# Patient Record
Sex: MALE | Race: WHITE | HISPANIC OR LATINO | Employment: FULL TIME | URBAN - METROPOLITAN AREA
[De-identification: names, ages, dates, MRNs, and addresses within clinical notes are randomized per-mention and may not be internally consistent; named-entity substitution may affect disease eponyms.]

---

## 2018-03-28 ENCOUNTER — HOSPITAL ENCOUNTER (OUTPATIENT)
Facility: HOSPITAL | Age: 38
Discharge: HOME | End: 2018-03-30
Attending: EMERGENCY MEDICINE | Admitting: HOSPITALIST

## 2018-03-28 DIAGNOSIS — R07.89 CHEST WALL PAIN: ICD-10-CM

## 2018-03-28 DIAGNOSIS — M25.50 ARTHRALGIA, UNSPECIFIED JOINT: ICD-10-CM

## 2018-03-28 DIAGNOSIS — R50.9 FEVER, UNSPECIFIED FEVER CAUSE: Primary | ICD-10-CM

## 2018-03-28 DIAGNOSIS — I38 ENDOCARDITIS: ICD-10-CM

## 2018-03-28 DIAGNOSIS — D72.829 LEUKOCYTOSIS, UNSPECIFIED TYPE: ICD-10-CM

## 2018-03-28 LAB
ALBUMIN SERPL BCP-MCNC: 3.2 G/DL
ALP SERPL-CCNC: 143 U/L
ALT SERPL W/O P-5'-P-CCNC: 138 U/L
ANION GAP SERPL CALC-SCNC: 10 MMOL/L
AST SERPL-CCNC: 63 U/L
BASOPHILS # BLD AUTO: 0.05 K/UL
BASOPHILS NFR BLD: 0.3 %
BILIRUB SERPL-MCNC: 0.3 MG/DL
BILIRUB UR QL STRIP: NEGATIVE
BUN SERPL-MCNC: 12 MG/DL
CALCIUM SERPL-MCNC: 9.5 MG/DL
CHLORIDE SERPL-SCNC: 103 MMOL/L
CK SERPL-CCNC: 60 U/L
CLARITY UR: CLEAR
CO2 SERPL-SCNC: 24 MMOL/L
COLOR UR: YELLOW
CREAT SERPL-MCNC: 0.8 MG/DL
CRP SERPL-MCNC: 147.1 MG/L
DIFFERENTIAL METHOD: ABNORMAL
EOSINOPHIL # BLD AUTO: 0.1 K/UL
EOSINOPHIL NFR BLD: 0.6 %
ERYTHROCYTE [DISTWIDTH] IN BLOOD BY AUTOMATED COUNT: 12.5 %
ERYTHROCYTE [SEDIMENTATION RATE] IN BLOOD BY WESTERGREN METHOD: 103 MM/HR
EST. GFR  (AFRICAN AMERICAN): >60 ML/MIN/1.73 M^2
EST. GFR  (NON AFRICAN AMERICAN): >60 ML/MIN/1.73 M^2
GLUCOSE SERPL-MCNC: 108 MG/DL
GLUCOSE UR QL STRIP: NEGATIVE
HCT VFR BLD AUTO: 38.3 %
HGB BLD-MCNC: 12.8 G/DL
HGB UR QL STRIP: ABNORMAL
INR PPP: 1.2
KETONES UR QL STRIP: NEGATIVE
LEUKOCYTE ESTERASE UR QL STRIP: NEGATIVE
LYMPHOCYTES # BLD AUTO: 3.1 K/UL
LYMPHOCYTES NFR BLD: 18 %
MCH RBC QN AUTO: 29.4 PG
MCHC RBC AUTO-ENTMCNC: 33.4 G/DL
MCV RBC AUTO: 88 FL
MICROSCOPIC COMMENT: ABNORMAL
MONOCYTES # BLD AUTO: 1.6 K/UL
MONOCYTES NFR BLD: 8.9 %
NEUTROPHILS # BLD AUTO: 12.6 K/UL
NEUTROPHILS NFR BLD: 72.2 %
NITRITE UR QL STRIP: NEGATIVE
PH UR STRIP: 6 [PH] (ref 5–8)
PLATELET # BLD AUTO: 417 K/UL
PMV BLD AUTO: 9.3 FL
POTASSIUM SERPL-SCNC: 3.9 MMOL/L
PROT SERPL-MCNC: 8.3 G/DL
PROT UR QL STRIP: NEGATIVE
PROTHROMBIN TIME: 12.3 SEC
RBC # BLD AUTO: 4.35 M/UL
RBC #/AREA URNS HPF: 5 /HPF (ref 0–4)
SODIUM SERPL-SCNC: 137 MMOL/L
SP GR UR STRIP: 1.01 (ref 1–1.03)
SQUAMOUS #/AREA URNS HPF: 5 /HPF
URATE SERPL-MCNC: 6.3 MG/DL
URN SPEC COLLECT METH UR: ABNORMAL
UROBILINOGEN UR STRIP-ACNC: NEGATIVE EU/DL
WBC # BLD AUTO: 17.46 K/UL

## 2018-03-28 PROCEDURE — 84550 ASSAY OF BLOOD/URIC ACID: CPT

## 2018-03-28 PROCEDURE — 85025 COMPLETE CBC W/AUTO DIFF WBC: CPT

## 2018-03-28 PROCEDURE — 87491 CHLMYD TRACH DNA AMP PROBE: CPT

## 2018-03-28 PROCEDURE — 93005 ELECTROCARDIOGRAM TRACING: CPT

## 2018-03-28 PROCEDURE — 81000 URINALYSIS NONAUTO W/SCOPE: CPT

## 2018-03-28 PROCEDURE — G0378 HOSPITAL OBSERVATION PER HR: HCPCS

## 2018-03-28 PROCEDURE — 93010 ELECTROCARDIOGRAM REPORT: CPT | Mod: ,,, | Performed by: INTERNAL MEDICINE

## 2018-03-28 PROCEDURE — 85610 PROTHROMBIN TIME: CPT

## 2018-03-28 PROCEDURE — 36415 COLL VENOUS BLD VENIPUNCTURE: CPT

## 2018-03-28 PROCEDURE — 82550 ASSAY OF CK (CPK): CPT

## 2018-03-28 PROCEDURE — 80307 DRUG TEST PRSMV CHEM ANLYZR: CPT

## 2018-03-28 PROCEDURE — 87040 BLOOD CULTURE FOR BACTERIA: CPT | Mod: 59

## 2018-03-28 PROCEDURE — 84145 PROCALCITONIN (PCT): CPT

## 2018-03-28 PROCEDURE — 96361 HYDRATE IV INFUSION ADD-ON: CPT

## 2018-03-28 PROCEDURE — 25000003 PHARM REV CODE 250: Performed by: NURSE PRACTITIONER

## 2018-03-28 PROCEDURE — 80053 COMPREHEN METABOLIC PANEL: CPT

## 2018-03-28 PROCEDURE — 86140 C-REACTIVE PROTEIN: CPT

## 2018-03-28 PROCEDURE — 99285 EMERGENCY DEPT VISIT HI MDM: CPT | Mod: 25

## 2018-03-28 PROCEDURE — 85651 RBC SED RATE NONAUTOMATED: CPT

## 2018-03-28 RX ADMIN — SODIUM CHLORIDE 1000 ML: 0.9 INJECTION, SOLUTION INTRAVENOUS at 08:03

## 2018-03-29 PROBLEM — R74.01 TRANSAMINITIS: Status: ACTIVE | Noted: 2018-03-29

## 2018-03-29 PROBLEM — D72.829 LEUKOCYTOSIS: Status: ACTIVE | Noted: 2018-03-29

## 2018-03-29 PROBLEM — Z72.0 TOBACCO USE: Chronic | Status: ACTIVE | Noted: 2018-03-29

## 2018-03-29 LAB
ALBUMIN SERPL BCP-MCNC: 2.8 G/DL
ALP SERPL-CCNC: 127 U/L
ALT SERPL W/O P-5'-P-CCNC: 106 U/L
AMPHET+METHAMPHET UR QL: NEGATIVE
ANION GAP SERPL CALC-SCNC: 7 MMOL/L
APAP SERPL-MCNC: <3 UG/ML
AST SERPL-CCNC: 43 U/L
BARBITURATES UR QL SCN>200 NG/ML: NEGATIVE
BASOPHILS # BLD AUTO: 0.06 K/UL
BASOPHILS NFR BLD: 0.3 %
BENZODIAZ UR QL SCN>200 NG/ML: NEGATIVE
BILIRUB SERPL-MCNC: 0.6 MG/DL
BUN SERPL-MCNC: 11 MG/DL
BZE UR QL SCN: NEGATIVE
CALCIUM SERPL-MCNC: 9.5 MG/DL
CANNABINOIDS UR QL SCN: NEGATIVE
CHLORIDE SERPL-SCNC: 106 MMOL/L
CHOLEST SERPL-MCNC: 107 MG/DL
CHOLEST/HDLC SERPL: 5.1 {RATIO}
CO2 SERPL-SCNC: 26 MMOL/L
CREAT SERPL-MCNC: 0.8 MG/DL
CREAT UR-MCNC: 102.1 MG/DL
DEPRECATED S PYO AG THROAT QL EIA: NEGATIVE
DIFFERENTIAL METHOD: ABNORMAL
EOSINOPHIL # BLD AUTO: 0.1 K/UL
EOSINOPHIL NFR BLD: 0.7 %
ERYTHROCYTE [DISTWIDTH] IN BLOOD BY AUTOMATED COUNT: 12.6 %
EST. GFR  (AFRICAN AMERICAN): >60 ML/MIN/1.73 M^2
EST. GFR  (NON AFRICAN AMERICAN): >60 ML/MIN/1.73 M^2
ETHANOL SERPL-MCNC: <10 MG/DL
FLUAV AG SPEC QL IA: NEGATIVE
FLUBV AG SPEC QL IA: NEGATIVE
GLUCOSE SERPL-MCNC: 89 MG/DL
HAV IGM SERPL QL IA: NEGATIVE
HBV CORE IGM SERPL QL IA: NEGATIVE
HBV SURFACE AG SERPL QL IA: NEGATIVE
HCT VFR BLD AUTO: 36.7 %
HCV AB SERPL QL IA: NEGATIVE
HDLC SERPL-MCNC: 21 MG/DL
HDLC SERPL: 19.6 %
HGB BLD-MCNC: 12.1 G/DL
HIV1+2 IGG SERPL QL IA.RAPID: NEGATIVE
LDLC SERPL CALC-MCNC: 68.8 MG/DL
LIPASE SERPL-CCNC: 13 U/L
LYMPHOCYTES # BLD AUTO: 3 K/UL
LYMPHOCYTES NFR BLD: 15.9 %
MAGNESIUM SERPL-MCNC: 2 MG/DL
MCH RBC QN AUTO: 29.4 PG
MCHC RBC AUTO-ENTMCNC: 33 G/DL
MCV RBC AUTO: 89 FL
METHADONE UR QL SCN>300 NG/ML: NEGATIVE
MONOCYTES # BLD AUTO: 1.3 K/UL
MONOCYTES NFR BLD: 6.6 %
NEUTROPHILS # BLD AUTO: 14.5 K/UL
NEUTROPHILS NFR BLD: 76.5 %
NONHDLC SERPL-MCNC: 86 MG/DL
OPIATES UR QL SCN: NEGATIVE
PCP UR QL SCN>25 NG/ML: NEGATIVE
PLATELET # BLD AUTO: 372 K/UL
PMV BLD AUTO: 9.3 FL
POTASSIUM SERPL-SCNC: 4.3 MMOL/L
PROCALCITONIN SERPL IA-MCNC: 0.1 NG/ML
PROT SERPL-MCNC: 7.4 G/DL
RBC # BLD AUTO: 4.12 M/UL
RHEUMATOID FACT SERPL-ACNC: <10 IU/ML
SODIUM SERPL-SCNC: 139 MMOL/L
SPECIMEN SOURCE: NORMAL
TOXICOLOGY INFORMATION: NORMAL
TRIGL SERPL-MCNC: 86 MG/DL
TSH SERPL DL<=0.005 MIU/L-ACNC: 2.28 UIU/ML
WBC # BLD AUTO: 18.92 K/UL

## 2018-03-29 PROCEDURE — 80061 LIPID PANEL: CPT

## 2018-03-29 PROCEDURE — 87081 CULTURE SCREEN ONLY: CPT

## 2018-03-29 PROCEDURE — 86618 LYME DISEASE ANTIBODY: CPT

## 2018-03-29 PROCEDURE — 25000003 PHARM REV CODE 250: Performed by: NURSE PRACTITIONER

## 2018-03-29 PROCEDURE — 36415 COLL VENOUS BLD VENIPUNCTURE: CPT

## 2018-03-29 PROCEDURE — 80074 ACUTE HEPATITIS PANEL: CPT

## 2018-03-29 PROCEDURE — 85025 COMPLETE CBC W/AUTO DIFF WBC: CPT

## 2018-03-29 PROCEDURE — 63600175 PHARM REV CODE 636 W HCPCS: Performed by: EMERGENCY MEDICINE

## 2018-03-29 PROCEDURE — 87400 INFLUENZA A/B EACH AG IA: CPT

## 2018-03-29 PROCEDURE — 87880 STREP A ASSAY W/OPTIC: CPT

## 2018-03-29 PROCEDURE — 86431 RHEUMATOID FACTOR QUANT: CPT

## 2018-03-29 PROCEDURE — 96365 THER/PROPH/DIAG IV INF INIT: CPT | Mod: 25

## 2018-03-29 PROCEDURE — 84443 ASSAY THYROID STIM HORMONE: CPT

## 2018-03-29 PROCEDURE — G0378 HOSPITAL OBSERVATION PER HR: HCPCS

## 2018-03-29 PROCEDURE — 80320 DRUG SCREEN QUANTALCOHOLS: CPT

## 2018-03-29 PROCEDURE — 80053 COMPREHEN METABOLIC PANEL: CPT

## 2018-03-29 PROCEDURE — 86038 ANTINUCLEAR ANTIBODIES: CPT

## 2018-03-29 PROCEDURE — 83735 ASSAY OF MAGNESIUM: CPT

## 2018-03-29 PROCEDURE — 86617 LYME DISEASE ANTIBODY: CPT | Mod: 59

## 2018-03-29 PROCEDURE — 86703 HIV-1/HIV-2 1 RESULT ANTBDY: CPT

## 2018-03-29 PROCEDURE — 83690 ASSAY OF LIPASE: CPT

## 2018-03-29 PROCEDURE — 80329 ANALGESICS NON-OPIOID 1 OR 2: CPT

## 2018-03-29 RX ORDER — IBUPROFEN 400 MG/1
400 TABLET ORAL EVERY 6 HOURS PRN
Status: DISCONTINUED | OUTPATIENT
Start: 2018-03-29 | End: 2018-03-30 | Stop reason: HOSPADM

## 2018-03-29 RX ORDER — ACETAMINOPHEN 10 MG/ML
1000 INJECTION, SOLUTION INTRAVENOUS ONCE
Status: COMPLETED | OUTPATIENT
Start: 2018-03-29 | End: 2018-03-29

## 2018-03-29 RX ORDER — SODIUM CHLORIDE 9 MG/ML
INJECTION, SOLUTION INTRAVENOUS CONTINUOUS
Status: DISCONTINUED | OUTPATIENT
Start: 2018-03-29 | End: 2018-03-29

## 2018-03-29 RX ORDER — ONDANSETRON 2 MG/ML
4 INJECTION INTRAMUSCULAR; INTRAVENOUS EVERY 6 HOURS PRN
Status: DISCONTINUED | OUTPATIENT
Start: 2018-03-29 | End: 2018-03-30 | Stop reason: HOSPADM

## 2018-03-29 RX ORDER — IBUPROFEN 200 MG
1 TABLET ORAL DAILY
Status: DISCONTINUED | OUTPATIENT
Start: 2018-03-29 | End: 2018-03-30 | Stop reason: HOSPADM

## 2018-03-29 RX ORDER — SODIUM CHLORIDE 9 MG/ML
INJECTION, SOLUTION INTRAVENOUS CONTINUOUS
Status: ACTIVE | OUTPATIENT
Start: 2018-03-29 | End: 2018-03-29

## 2018-03-29 RX ADMIN — SODIUM CHLORIDE: 0.9 INJECTION, SOLUTION INTRAVENOUS at 02:03

## 2018-03-29 RX ADMIN — SODIUM CHLORIDE: 0.9 INJECTION, SOLUTION INTRAVENOUS at 09:03

## 2018-03-29 RX ADMIN — IBUPROFEN 400 MG: 400 TABLET, FILM COATED ORAL at 10:03

## 2018-03-29 RX ADMIN — ACETAMINOPHEN 1000 MG: 10 INJECTION, SOLUTION INTRAVENOUS at 12:03

## 2018-03-29 NOTE — PLAN OF CARE
Problem: Patient Care Overview  Goal: Plan of Care Review  Outcome: Ongoing (interventions implemented as appropriate)  Fall prevention precautions maintained, pt remained free of falls throughout shift, call bell and personal items within reach, pt afebrile since coming to floor, no complaints of pain at this time. 24 hour chart check completed. Will continue to monitor

## 2018-03-29 NOTE — PLAN OF CARE
Problem: Patient Care Overview  Goal: Plan of Care Review  Outcome: Ongoing (interventions implemented as appropriate)  Patient remains free of falls and safety precautions maintained. Afebrile. No complaints of pain. Temp monitored. Will continue to monitor. 12 hour chart check.

## 2018-03-29 NOTE — SUBJECTIVE & OBJECTIVE
History reviewed. No pertinent past medical history.    History reviewed. No pertinent surgical history.    Review of patient's allergies indicates:  No Known Allergies    No current facility-administered medications on file prior to encounter.      No current outpatient prescriptions on file prior to encounter.     Family History     Unknown to patient        Social History Main Topics    Smoking status: Current Every Day Smoker     Types: Cigarettes    Smokeless tobacco: Never Used    Alcohol use No    Drug use: No    Sexual activity: Not on file     Review of Systems   Constitutional: Positive for fever. Negative for activity change, appetite change, chills, diaphoresis, fatigue and unexpected weight change.   HENT: Positive for sore throat. Negative for congestion, dental problem, ear pain, mouth sores, postnasal drip, rhinorrhea, sinus pressure and trouble swallowing.    Eyes: Negative for photophobia and visual disturbance.   Respiratory: Negative for apnea, cough, chest tightness, shortness of breath and wheezing.    Cardiovascular: Negative for chest pain, palpitations and leg swelling.   Gastrointestinal: Negative for abdominal distention, abdominal pain, blood in stool, constipation, diarrhea, nausea and vomiting.   Endocrine: Negative for polydipsia, polyphagia and polyuria.   Genitourinary: Negative for decreased urine volume, difficulty urinating, discharge, dysuria, flank pain, frequency, hematuria, penile pain and urgency.   Musculoskeletal: Positive for arthralgias (Right knee and left ankle pain) and myalgias. Negative for back pain, gait problem, joint swelling, neck pain and neck stiffness.        Generalized body aches.    Skin: Negative for color change, pallor, rash and wound.   Allergic/Immunologic: Negative for immunocompromised state.   Neurological: Negative for dizziness, tremors, seizures, syncope, facial asymmetry, speech difficulty, weakness, light-headedness, numbness and  headaches.   Hematological: Does not bruise/bleed easily.   Psychiatric/Behavioral: Negative for confusion, hallucinations and sleep disturbance. The patient is not nervous/anxious.    All other systems reviewed and are negative.    Objective:     Vital Signs (Most Recent):  Temp: 99.4 °F (37.4 °C) (03/28/18 1917)  Pulse: 76 (03/29/18 0005)  Resp: 18 (03/29/18 0005)  BP: 125/80 (03/29/18 0005)  SpO2: 98 % (03/29/18 0005) Vital Signs (24h Range):  Temp:  [99.4 °F (37.4 °C)] 99.4 °F (37.4 °C)  Pulse:  [76-88] 76  Resp:  [18-20] 18  SpO2:  [95 %-98 %] 98 %  BP: (125-129)/(76-80) 125/80     Weight: 101.5 kg (223 lb 12.3 oz)  Body mass index is 32.57 kg/m².    Physical Exam   Constitutional: He is oriented to person, place, and time. He appears well-developed and well-nourished. No distress.   HENT:   Head: Normocephalic and atraumatic.   Eyes: Conjunctivae are normal.   PERRL; EOM intact.   Neck: Normal range of motion. Neck supple. No JVD present.   Cardiovascular: Normal rate, regular rhythm, S1 normal, S2 normal and intact distal pulses.   No extrasystoles are present. Exam reveals no gallop.    No murmur heard.  Pulses:       Radial pulses are 2+ on the right side, and 2+ on the left side.        Dorsalis pedis pulses are 2+ on the right side, and 2+ on the left side.        Posterior tibial pulses are 2+ on the right side, and 2+ on the left side.   Pulmonary/Chest: Effort normal and breath sounds normal. No accessory muscle usage. No tachypnea. No respiratory distress. He has no wheezes. He has no rhonchi. He has no rales.   Abdominal: Soft. Bowel sounds are normal. He exhibits no distension. There is no tenderness. There is no rebound, no guarding and no CVA tenderness.   Musculoskeletal: Normal range of motion. He exhibits no edema, tenderness or deformity.   Neurological: He is alert and oriented to person, place, and time. No cranial nerve deficit or sensory deficit.   Skin: Skin is warm, dry and intact. No  rash noted. He is not diaphoretic. No cyanosis or erythema.   Psychiatric: He has a normal mood and affect. His speech is normal and behavior is normal. Cognition and memory are normal.   Nursing note and vitals reviewed.          Significant Labs:   Results for orders placed or performed during the hospital encounter of 03/28/18   CBC auto differential   Result Value Ref Range    WBC 17.46 (H) 3.90 - 12.70 K/uL    RBC 4.35 (L) 4.60 - 6.20 M/uL    Hemoglobin 12.8 (L) 14.0 - 18.0 g/dL    Hematocrit 38.3 (L) 40.0 - 54.0 %    MCV 88 82 - 98 fL    MCH 29.4 27.0 - 31.0 pg    MCHC 33.4 32.0 - 36.0 g/dL    RDW 12.5 11.5 - 14.5 %    Platelets 417 (H) 150 - 350 K/uL    MPV 9.3 9.2 - 12.9 fL    Gran # (ANC) 12.6 (H) 1.8 - 7.7 K/uL    Lymph # 3.1 1.0 - 4.8 K/uL    Mono # 1.6 (H) 0.3 - 1.0 K/uL    Eos # 0.1 0.0 - 0.5 K/uL    Baso # 0.05 0.00 - 0.20 K/uL    Gran% 72.2 38.0 - 73.0 %    Lymph% 18.0 18.0 - 48.0 %    Mono% 8.9 4.0 - 15.0 %    Eosinophil% 0.6 0.0 - 8.0 %    Basophil% 0.3 0.0 - 1.9 %    Differential Method Automated    Comprehensive metabolic panel   Result Value Ref Range    Sodium 137 136 - 145 mmol/L    Potassium 3.9 3.5 - 5.1 mmol/L    Chloride 103 95 - 110 mmol/L    CO2 24 23 - 29 mmol/L    Glucose 108 70 - 110 mg/dL    BUN, Bld 12 6 - 20 mg/dL    Creatinine 0.8 0.5 - 1.4 mg/dL    Calcium 9.5 8.7 - 10.5 mg/dL    Total Protein 8.3 6.0 - 8.4 g/dL    Albumin 3.2 (L) 3.5 - 5.2 g/dL    Total Bilirubin 0.3 0.1 - 1.0 mg/dL    Alkaline Phosphatase 143 (H) 55 - 135 U/L    AST 63 (H) 10 - 40 U/L     (H) 10 - 44 U/L    Anion Gap 10 8 - 16 mmol/L    eGFR if African American >60 >60 mL/min/1.73 m^2    eGFR if non African American >60 >60 mL/min/1.73 m^2   Urinalysis - Clean Catch   Result Value Ref Range    Specimen UA Urine, Clean Catch     Color, UA Yellow Yellow, Straw, Katerin    Appearance, UA Clear Clear    pH, UA 6.0 5.0 - 8.0    Specific Gravity, UA 1.015 1.005 - 1.030    Protein, UA Negative Negative    Glucose,  UA Negative Negative    Ketones, UA Negative Negative    Bilirubin (UA) Negative Negative    Occult Blood UA 2+ (A) Negative    Nitrite, UA Negative Negative    Urobilinogen, UA Negative <2.0 EU/dL    Leukocytes, UA Negative Negative   Protime-INR   Result Value Ref Range    Prothrombin Time 12.3 9.0 - 12.5 sec    INR 1.2 0.8 - 1.2   CPK   Result Value Ref Range    CPK 60 20 - 200 U/L   Urinalysis Microscopic   Result Value Ref Range    RBC, UA 5 (H) 0 - 4 /hpf    Squam Epithel, UA 5 /hpf    Microscopic Comment SEE COMMENT    C-reactive protein   Result Value Ref Range    .1 (H) 0.0 - 8.2 mg/L   Uric acid   Result Value Ref Range    Uric Acid 6.3 3.4 - 7.0 mg/dL   Sedimentation rate, manual   Result Value Ref Range    Sed Rate 103 (H) 0 - 10 mm/Hr      All pertinent labs within the past 24 hours have been reviewed.    Significant Imaging:   Imaging Results          X-Ray Chest PA And Lateral (Final result)  Result time 03/28/18 20:09:36    Final result by Adela Leone MD (03/28/18 20:09:36)                 Impression:         Negative two-view chest x-ray.      Electronically signed by: ADELA LEONE MD  Date:     03/28/18  Time:    20:09              Narrative:    Two-view chest x-ray.03/28/18 19:44:43    Clinical indication: cough      Heart size is normal. The lung fields are clear. No acute pulmonary infiltrate.                             I have reviewed all pertinent imaging results/findings within the past 24 hours.     EKG: (personally reviewed)  Normal sinus rhythm, LVH.  No acute ST-T abnormalities.  No previous to compare.

## 2018-03-29 NOTE — HPI
History is limited due to patient being poor historian + language barrier.  Mr. Madrigal is a Greek speaking 36yo male with no significant PMHx, who presented to the ED with c/o subjective fevers x 2 weeks.  Associated generalized body aches with arthralgias, and sore throat.  No aggravating or alleviating factors.  Denies any chills, dipahoresis, lightheadedness/dizziness, syncope, HA, CP, palpitations, SOB, cough, edema, ABD pain, N/V/D, dysuria, hematuria, weakness, fatigue, or rash.  Denies any high-risk sexual behavior or IVDU.  Work-up in ED resulted WBC 17, , , AST 63, , alk phos 143, albumin 3.2, T bili 0.3.  CXR and UA unremarkable.  Upon arrival to ED, patient with low grade temp of 99.4, currently afebrile.

## 2018-03-29 NOTE — PLAN OF CARE
Problem: Patient Care Overview  Goal: Plan of Care Review  Outcome: Ongoing (interventions implemented as appropriate)  Fall prevention precautions maintained, pt remained free of falls throughout shift, call bell and personal items within reach, pt's pain adequately controlled by prn pain medication, pt afebrile at this time. 24 hour chart check completed. Will continue to monitor

## 2018-03-29 NOTE — ED NOTES
Pt. Noted lying in bed resting to comfort. Opens eyes to verbal stimuli, respirations even and unlabored. No acute distress noted at this time. Awaiting bed placement. Plan of care continued.

## 2018-03-29 NOTE — H&P
[facility] Hospital Medicine  History & Physical    Patient Name: Christopher Madrigal  MRN: [id number]  Admission Date: 3/29/2018  Attending Physician: Lawrence Garcia MD   Primary Care Provider: Primary Doctor No         Patient information was obtained from patient and ER records.     Subjective:     Principal Problem:Fever of unknown origin    Chief Complaint:   Chief Complaint   Patient presents with    Fever     states fever for 2 weeks with joint pain        HPI: History is limited due to patient being poor historian + language barrier.  Mr. Madrigal is a Moroccan speaking 36yo male  with no significant PMHx, who presented to the ED with c/o subjective fevers x 2 weeks.  Associated generalized body aches with arthralgias, and sore throat.  No aggravating or alleviating factors.  Denies any chills, dipahoresis, lightheadedness/dizziness, syncope, HA, CP, palpitations, SOB, cough, edema, ABD pain, N/V/D, dysuria, hematuria, weakness, fatigue, or rash.  Denies any high-risk sexual behavior or IVDU.  Work-up in ED resulted WBC 17, , , AST 63, , alk phos 143, albumin 3.2, T bili 0.3.  CXR and UA unremarkable.  Upon arrival to ED, patient with low grade temp of 99.4, currently afebrile.    History reviewed. No pertinent past medical history.    History reviewed. No pertinent surgical history.    Review of patient's allergies indicates:  No Known Allergies    No current facility-administered medications on file prior to encounter.      No current outpatient prescriptions on file prior to encounter.     Family History     Unknown to patient        Social History Main Topics    Smoking status: Current Every Day Smoker     Types: Cigarettes    Smokeless tobacco: Never Used    Alcohol use No    Drug use: No    Sexual activity: Not on file     Review of Systems   Constitutional: Positive for fever. Negative for activity change, appetite change, chills, diaphoresis, fatigue and  unexpected weight change.   HENT: Positive for sore throat. Negative for congestion, dental problem, ear pain, mouth sores, postnasal drip, rhinorrhea, sinus pressure and trouble swallowing.    Eyes: Negative for photophobia and visual disturbance.   Respiratory: Negative for apnea, cough, chest tightness, shortness of breath and wheezing.    Cardiovascular: Negative for chest pain, palpitations and leg swelling.   Gastrointestinal: Negative for abdominal distention, abdominal pain, blood in stool, constipation, diarrhea, nausea and vomiting.   Endocrine: Negative for polydipsia, polyphagia and polyuria.   Genitourinary: Negative for decreased urine volume, difficulty urinating, discharge, dysuria, flank pain, frequency, hematuria, penile pain and urgency.   Musculoskeletal: Positive for arthralgias (Right knee and left ankle pain) and myalgias. Negative for back pain, gait problem, joint swelling, neck pain and neck stiffness.        Generalized body aches.    Skin: Negative for color change, pallor, rash and wound.   Allergic/Immunologic: Negative for immunocompromised state.   Neurological: Negative for dizziness, tremors, seizures, syncope, facial asymmetry, speech difficulty, weakness, light-headedness, numbness and headaches.   Hematological: Does not bruise/bleed easily.   Psychiatric/Behavioral: Negative for confusion, hallucinations and sleep disturbance. The patient is not nervous/anxious.    All other systems reviewed and are negative.    Objective:     Vital Signs (Most Recent):  Temp: 99.4 °F (37.4 °C) (03/28/18 1917)  Pulse: 76 (03/29/18 0005)  Resp: 18 (03/29/18 0005)  BP: 125/80 (03/29/18 0005)  SpO2: 98 % (03/29/18 0005) Vital Signs (24h Range):  Temp:  [99.4 °F (37.4 °C)] 99.4 °F (37.4 °C)  Pulse:  [76-88] 76  Resp:  [18-20] 18  SpO2:  [95 %-98 %] 98 %  BP: (125-129)/(76-80) 125/80     Weight: 101.5 kg (223 lb 12.3 oz)  Body mass index is 32.57 kg/m².    Physical Exam   Constitutional: He is  oriented to person, place, and time. He appears well-developed and well-nourished. No distress.   HENT:   Head: Normocephalic and atraumatic.   Eyes: Conjunctivae are normal.   PERRL; EOM intact.   Neck: Normal range of motion. Neck supple. No JVD present.   Cardiovascular: Normal rate, regular rhythm, S1 normal, S2 normal and intact distal pulses.   No extrasystoles are present. Exam reveals no gallop.    No murmur heard.  Pulses:       Radial pulses are 2+ on the right side, and 2+ on the left side.        Dorsalis pedis pulses are 2+ on the right side, and 2+ on the left side.        Posterior tibial pulses are 2+ on the right side, and 2+ on the left side.   Pulmonary/Chest: Effort normal and breath sounds normal. No accessory muscle usage. No tachypnea. No respiratory distress. He has no wheezes. He has no rhonchi. He has no rales.   Abdominal: Soft. Bowel sounds are normal. He exhibits no distension. There is no tenderness. There is no rebound, no guarding and no CVA tenderness.   Musculoskeletal: Normal range of motion. He exhibits no edema, tenderness or deformity.   Neurological: He is alert and oriented to person, place, and time. No cranial nerve deficit or sensory deficit.   Skin: Skin is warm, dry and intact. No rash noted. He is not diaphoretic. No cyanosis or erythema.   Psychiatric: He has a normal mood and affect. His speech is normal and behavior is normal. Cognition and memory are normal.   Nursing note and vitals reviewed.          Significant Labs:   Results for orders placed or performed during the hospital encounter of 03/28/18   CBC auto differential   Result Value Ref Range    WBC 17.46 (H) 3.90 - 12.70 K/uL    RBC 4.35 (L) 4.60 - 6.20 M/uL    Hemoglobin 12.8 (L) 14.0 - 18.0 g/dL    Hematocrit 38.3 (L) 40.0 - 54.0 %    MCV 88 82 - 98 fL    MCH 29.4 27.0 - 31.0 pg    MCHC 33.4 32.0 - 36.0 g/dL    RDW 12.5 11.5 - 14.5 %    Platelets 417 (H) 150 - 350 K/uL    MPV 9.3 9.2 - 12.9 fL    Gran #  (ANC) 12.6 (H) 1.8 - 7.7 K/uL    Lymph # 3.1 1.0 - 4.8 K/uL    Mono # 1.6 (H) 0.3 - 1.0 K/uL    Eos # 0.1 0.0 - 0.5 K/uL    Baso # 0.05 0.00 - 0.20 K/uL    Gran% 72.2 38.0 - 73.0 %    Lymph% 18.0 18.0 - 48.0 %    Mono% 8.9 4.0 - 15.0 %    Eosinophil% 0.6 0.0 - 8.0 %    Basophil% 0.3 0.0 - 1.9 %    Differential Method Automated    Comprehensive metabolic panel   Result Value Ref Range    Sodium 137 136 - 145 mmol/L    Potassium 3.9 3.5 - 5.1 mmol/L    Chloride 103 95 - 110 mmol/L    CO2 24 23 - 29 mmol/L    Glucose 108 70 - 110 mg/dL    BUN, Bld 12 6 - 20 mg/dL    Creatinine 0.8 0.5 - 1.4 mg/dL    Calcium 9.5 8.7 - 10.5 mg/dL    Total Protein 8.3 6.0 - 8.4 g/dL    Albumin 3.2 (L) 3.5 - 5.2 g/dL    Total Bilirubin 0.3 0.1 - 1.0 mg/dL    Alkaline Phosphatase 143 (H) 55 - 135 U/L    AST 63 (H) 10 - 40 U/L     (H) 10 - 44 U/L    Anion Gap 10 8 - 16 mmol/L    eGFR if African American >60 >60 mL/min/1.73 m^2    eGFR if non African American >60 >60 mL/min/1.73 m^2   Urinalysis - Clean Catch   Result Value Ref Range    Specimen UA Urine, Clean Catch     Color, UA Yellow Yellow, Straw, Katerin    Appearance, UA Clear Clear    pH, UA 6.0 5.0 - 8.0    Specific Gravity, UA 1.015 1.005 - 1.030    Protein, UA Negative Negative    Glucose, UA Negative Negative    Ketones, UA Negative Negative    Bilirubin (UA) Negative Negative    Occult Blood UA 2+ (A) Negative    Nitrite, UA Negative Negative    Urobilinogen, UA Negative <2.0 EU/dL    Leukocytes, UA Negative Negative   Protime-INR   Result Value Ref Range    Prothrombin Time 12.3 9.0 - 12.5 sec    INR 1.2 0.8 - 1.2   CPK   Result Value Ref Range    CPK 60 20 - 200 U/L   Urinalysis Microscopic   Result Value Ref Range    RBC, UA 5 (H) 0 - 4 /hpf    Squam Epithel, UA 5 /hpf    Microscopic Comment SEE COMMENT    C-reactive protein   Result Value Ref Range    .1 (H) 0.0 - 8.2 mg/L   Uric acid   Result Value Ref Range    Uric Acid 6.3 3.4 - 7.0 mg/dL   Sedimentation rate,  manual   Result Value Ref Range    Sed Rate 103 (H) 0 - 10 mm/Hr      All pertinent labs within the past 24 hours have been reviewed.    Significant Imaging:   Imaging Results          X-Ray Chest PA And Lateral (Final result)  Result time 03/28/18 20:09:36    Final result by Adela Leone MD (03/28/18 20:09:36)                 Impression:         Negative two-view chest x-ray.      Electronically signed by: ADELA LEONE MD  Date:     03/28/18  Time:    20:09              Narrative:    Two-view chest x-ray.03/28/18 19:44:43    Clinical indication: cough      Heart size is normal. The lung fields are clear. No acute pulmonary infiltrate.                             I have reviewed all pertinent imaging results/findings within the past 24 hours.     EKG: (personally reviewed)  Normal sinus rhythm, LVH.  No acute ST-T abnormalities.  No previous to compare.         Assessment/Plan:     * Fever of unknown origin with leukocytosis    - , .  - No evidence of infectious process.  - Blood cultures pending.  - Check for gonorrhea given arthralgias.  - Check procalcitonin.  - UDS.  - Flu swab.  - PRN antipyretics.         Transaminitis    - Denies EtOH use.  - Liver u/s in AM.  - Repeat LFT's in AM.        Tobacco use    - Counseled on cessation.  - Nicotine patch.          VTE Risk Mitigation         Ordered     IP VTE LOW RISK PATIENT  Once      03/29/18 0158             WILTON Singh  Department of Hospital Medicine   [facility]

## 2018-03-29 NOTE — ASSESSMENT & PLAN NOTE
- , .  - No evidence of infectious process.  - Blood cultures pending.  - Check for gonorrhea given arthralgias.  - Check procalcitonin.  - UDS.  - Flu swab.  - PRN antipyretics.

## 2018-03-30 VITALS
HEART RATE: 76 BPM | WEIGHT: 146.81 LBS | RESPIRATION RATE: 18 BRPM | HEIGHT: 70 IN | OXYGEN SATURATION: 97 % | BODY MASS INDEX: 21.02 KG/M2 | TEMPERATURE: 100 F | DIASTOLIC BLOOD PRESSURE: 65 MMHG | SYSTOLIC BLOOD PRESSURE: 131 MMHG

## 2018-03-30 LAB
ALBUMIN SERPL BCP-MCNC: 2.8 G/DL
ALP SERPL-CCNC: 123 U/L
ALT SERPL W/O P-5'-P-CCNC: 102 U/L
ANION GAP SERPL CALC-SCNC: 8 MMOL/L
AST SERPL-CCNC: 40 U/L
BASOPHILS # BLD AUTO: 0.06 K/UL
BASOPHILS NFR BLD: 0.4 %
BILIRUB SERPL-MCNC: 0.5 MG/DL
BUN SERPL-MCNC: 10 MG/DL
C TRACH DNA SPEC QL NAA+PROBE: NOT DETECTED
CALCIUM SERPL-MCNC: 9.7 MG/DL
CHLORIDE SERPL-SCNC: 104 MMOL/L
CO2 SERPL-SCNC: 27 MMOL/L
CREAT SERPL-MCNC: 0.8 MG/DL
DIASTOLIC DYSFUNCTION: NO
DIFFERENTIAL METHOD: ABNORMAL
EOSINOPHIL # BLD AUTO: 0.2 K/UL
EOSINOPHIL NFR BLD: 1.2 %
ERYTHROCYTE [DISTWIDTH] IN BLOOD BY AUTOMATED COUNT: 12.6 %
EST. GFR  (AFRICAN AMERICAN): >60 ML/MIN/1.73 M^2
EST. GFR  (NON AFRICAN AMERICAN): >60 ML/MIN/1.73 M^2
ESTIMATED PA SYSTOLIC PRESSURE: 30.47
GLUCOSE SERPL-MCNC: 83 MG/DL
HCT VFR BLD AUTO: 37.1 %
HGB BLD-MCNC: 12.1 G/DL
LYMPHOCYTES # BLD AUTO: 2.7 K/UL
LYMPHOCYTES NFR BLD: 16.9 %
MCH RBC QN AUTO: 29.2 PG
MCHC RBC AUTO-ENTMCNC: 32.6 G/DL
MCV RBC AUTO: 89 FL
MONOCYTES # BLD AUTO: 1.5 K/UL
MONOCYTES NFR BLD: 9 %
N GONORRHOEA DNA SPEC QL NAA+PROBE: NOT DETECTED
NEUTROPHILS # BLD AUTO: 11.7 K/UL
NEUTROPHILS NFR BLD: 72.9 %
PLATELET # BLD AUTO: 385 K/UL
PMV BLD AUTO: 9.5 FL
POTASSIUM SERPL-SCNC: 4.5 MMOL/L
PROT SERPL-MCNC: 7.6 G/DL
RBC # BLD AUTO: 4.15 M/UL
RETIRED EF AND QEF - SEE NOTES: 60 (ref 55–65)
SODIUM SERPL-SCNC: 139 MMOL/L
WBC # BLD AUTO: 16.09 K/UL

## 2018-03-30 PROCEDURE — 87591 N.GONORRHOEAE DNA AMP PROB: CPT

## 2018-03-30 PROCEDURE — 93306 TTE W/DOPPLER COMPLETE: CPT

## 2018-03-30 PROCEDURE — 85025 COMPLETE CBC W/AUTO DIFF WBC: CPT

## 2018-03-30 PROCEDURE — 25000003 PHARM REV CODE 250: Performed by: INTERNAL MEDICINE

## 2018-03-30 PROCEDURE — 80053 COMPREHEN METABOLIC PANEL: CPT

## 2018-03-30 PROCEDURE — 63600175 PHARM REV CODE 636 W HCPCS: Performed by: INTERNAL MEDICINE

## 2018-03-30 PROCEDURE — 36415 COLL VENOUS BLD VENIPUNCTURE: CPT

## 2018-03-30 PROCEDURE — 93306 TTE W/DOPPLER COMPLETE: CPT | Mod: 26,,, | Performed by: INTERNAL MEDICINE

## 2018-03-30 PROCEDURE — G0378 HOSPITAL OBSERVATION PER HR: HCPCS

## 2018-03-30 RX ORDER — IBUPROFEN 400 MG/1
400 TABLET ORAL EVERY 6 HOURS PRN
Qty: 10 TABLET | Refills: 0 | Status: SHIPPED | OUTPATIENT
Start: 2018-03-30

## 2018-03-30 RX ADMIN — CEFTRIAXONE SODIUM 2 G: 2 INJECTION, POWDER, FOR SOLUTION INTRAMUSCULAR; INTRAVENOUS at 06:03

## 2018-03-30 NOTE — HOSPITAL COURSE
Patient was seen and examined today . He mention he feels better today his joints improved but still have some pain . Blood culture negative . ua negative ,leukocytosis droped to 16 k,daisy pending ,rh negative ,pcr gonorrhea chlymadia negative . 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 Patient will follow up with open clinic in one week on cbc  . He stable to discharge . No fever during hospital stay . Case discussed with infectious disease ok to discharge and follow up on pending results

## 2018-03-30 NOTE — HPI
37 year old man without any significant past medical history who was admitted with 2 weeks of subjective fevers . 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 He is sexually active. Work-up in ED resulted WBC 17, , . GC chlamydia assay is negative. Serum procalcitonin  is normal.    Chest x-ray is normal. At this time,he is complaining of right great toe pain.

## 2018-03-30 NOTE — ASSESSMENT & PLAN NOTE
Will  Do cardiac echo.  Follow JAMIA.  If fever persists ,will do indium scan .  The migratory nature of the joint pain is suggestive of gonococcal infection.  Will check for blood cultures, start empiric rocephin ,check for pharyngeal swab for gonorrhoea

## 2018-03-30 NOTE — DISCHARGE SUMMARY
[facility] Hospital Medicine  Discharge Summary      Patient Name: Christopher Madrigal  MRN: [id number]  Admission Date: 3/28/2018  Hospital Length of Stay: 0 days  Discharge Date and Time:  03/30/2018 2:44 PM  Attending Physician: Ciarra Figueroa MD   Discharging Provider: Ciarra Figueroa MD  Primary Care Provider: Primary Doctor No      HPI:   History is limited due to patient being poor historian + language barrier.  Mr. Madrigal is a [place]er speaking 38yo male  with no significant PMHx, who presented to the ED with c/o subjective fevers x 2 weeks.  Associated generalized body aches with arthralgias, and sore throat.  No aggravating or alleviating factors.  Denies any chills, dipahoresis, lightheadedness/dizziness, syncope, HA, CP, palpitations, SOB, cough, edema, ABD pain, N/V/D, dysuria, hematuria, weakness, fatigue, or rash.  Denies any high-risk sexual behavior or IVDU.  Work-up in ED resulted WBC 17, , , AST 63, , alk phos 143, albumin 3.2, T bili 0.3.  CXR and UA unremarkable.  Upon arrival to ED, patient with low grade temp of 99.4, currently afebrile.    * No surgery found *      Hospital Course:   Patient was seen and examined today . He mention he feels better today his joints improved but still have some pain . Blood culture negative . ua negative ,leukocytosis droped to 16 k,daisy pending ,rh negative ,pcr gonorrhea chlymadia negative . 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 Patient will follow up with open clinic in one week on cbc  . He stable to discharge . No fever during hospital stay . Case discussed with infectious disease ok to discharge and follow up on pending results      Consults:   Consults          Status Ordering Provider     Inpatient consult to Hospitalist  Once     Provider:  Lawrence Garcia MD    Acknowledged FIDEL KENNEDY     Inpatient consult to Infectious Diseases  Once     Provider:  Leonides Hermosillo MD    Acknowledged LYNDA BECKER          No new Assessment & Plan notes have been filed under this hospital service since the last note was generated.  Service: Hospital Medicine    Final Active Diagnoses:    Diagnosis Date Noted POA    PRINCIPAL PROBLEM:  Fever of unknown origin with leukocytosis [R50.9] 03/28/2018 Yes    Transaminitis [R74.0] 03/29/2018 Yes    Tobacco use [Z72.0] 03/29/2018 Yes     Chronic      Problems Resolved During this Admission:    Diagnosis Date Noted Date Resolved POA       Discharged Condition: stable    Disposition: Home or Self Care    Follow Up:  Follow-up Information     open clinic .               Patient Instructions:   No discharge procedures on file.    Significant Diagnostic Studies: Labs:   BMP:   Recent Labs  Lab 03/28/18 2005 03/29/18 0446 03/30/18  0511    89 83    139 139   K 3.9 4.3 4.5    106 104   CO2 24 26 27   BUN 12 11 10   CREATININE 0.8 0.8 0.8   CALCIUM 9.5 9.5 9.7   MG  --  2.0  --        Pending Diagnostic Studies:     Procedure Component Value Units Date/Time    JAMIA [[id number]] Collected:  03/29/18 0811    Order Status:  Sent Lab Status:  In process Updated:  03/29/18 1644    Specimen:  Blood from Blood     B. burgdorferi Abs (Lyme Disease) [[id number]] Collected:  03/29/18 0811    Order Status:  Sent Lab Status:  In process Updated:  03/29/18 1644    Specimen:  Blood from Blood     Lyme Disease Western Blot [[id number]] Collected:  03/29/18 0811    Order Status:  Sent Lab Status:  In process Updated:  03/29/18 1644    Specimen:  Blood from Blood          Medications:  Reconciled Home Medications:      Medication List      START taking these medications    ibuprofen 400 MG tablet  Commonly known as:  ADVIL,MOTRIN  Take 1  tablet (400 mg total) by mouth every 6 (six) hours as needed for Temperature greater than (101).           Where to Get Your Medications      You can get these medications from any pharmacy    Bring a paper prescription for each of these medications  · ibuprofen 400 MG tablet         Indwelling Lines/Drains at time of discharge:   Lines/Drains/Airways          No matching active lines, drains, or airways          Time spent on the discharge of patient: 40 minutes  Patient was seen and examined on the date of discharge and determined to be suitable for discharge.         Ciarra Figueroa MD  Department of Hospital Medicine  [facility]

## 2018-03-30 NOTE — SUBJECTIVE & OBJECTIVE
History reviewed. No pertinent past medical history.    History reviewed. No pertinent surgical history.    Review of patient's allergies indicates:  No Known Allergies    Medications:  No prescriptions prior to admission.     Antibiotics     None        Antifungals     None        Antivirals     None             There is no immunization history on file for this patient.    Family History     Family history is unknown by patient.        Social History     Social History    Marital status: Single     Spouse name: N/A    Number of children: N/A    Years of education: N/A     Social History Main Topics    Smoking status: Current Every Day Smoker     Types: Cigarettes    Smokeless tobacco: Never Used    Alcohol use No    Drug use: No    Sexual activity: Not Currently     Other Topics Concern    None     Social History Narrative    None     Review of Systems   Constitutional: Positive for fever. Negative for activity change, appetite change, chills, diaphoresis, fatigue and unexpected weight change.   HENT: Positive for sore throat. Negative for congestion, dental problem, ear pain, mouth sores, postnasal drip, rhinorrhea, sinus pressure and trouble swallowing.    Eyes: Negative for photophobia and visual disturbance.   Respiratory: Negative for apnea, cough, chest tightness, shortness of breath and wheezing.    Cardiovascular: Negative for chest pain, palpitations and leg swelling.   Gastrointestinal: Negative for abdominal distention, abdominal pain, blood in stool, constipation, diarrhea, nausea and vomiting.   Endocrine: Negative for polydipsia, polyphagia and polyuria.   Genitourinary: Negative for decreased urine volume, difficulty urinating, discharge, dysuria, flank pain, frequency, hematuria, penile pain and urgency.   Musculoskeletal: Positive for arthralgias (Right knee and left ankle pain) and myalgias. Negative for back pain, gait problem, joint swelling, neck pain and neck stiffness.         Generalized body aches.    Skin: Negative for color change, pallor, rash and wound.   Allergic/Immunologic: Negative for immunocompromised state.   Neurological: Negative for dizziness, tremors, seizures, syncope, facial asymmetry, speech difficulty, weakness, light-headedness, numbness and headaches.   Hematological: Does not bruise/bleed easily.   Psychiatric/Behavioral: Negative for confusion, hallucinations and sleep disturbance. The patient is not nervous/anxious.    All other systems reviewed and are negative.    Objective:     Vital Signs (Most Recent):  Temp: 98.5 °F (36.9 °C) (03/30/18 0053)  Pulse: 68 (03/30/18 0053)  Resp: 18 (03/30/18 0053)  BP: 121/62 (03/30/18 0053)  SpO2: 95 % (03/30/18 0053) Vital Signs (24h Range):  Temp:  [98.5 °F (36.9 °C)-99.8 °F (37.7 °C)] 98.5 °F (36.9 °C)  Pulse:  [68-84] 68  Resp:  [14-18] 18  SpO2:  [95 %-97 %] 95 %  BP: (113-122)/(62-70) 121/62     Weight: 101.5 kg (223 lb 12.3 oz)  Body mass index is 32.57 kg/m².    Estimated Creatinine Clearance: 146.8 mL/min (based on SCr of 0.8 mg/dL).    Physical Exam   Constitutional: He is oriented to person, place, and time. He appears well-developed and well-nourished. No distress.   HENT:   Head: Normocephalic and atraumatic.   Eyes: Conjunctivae are normal.   PERRL; EOM intact.   Neck: Normal range of motion. Neck supple. No JVD present.   Cardiovascular: Normal rate, regular rhythm, S1 normal, S2 normal and intact distal pulses.   No extrasystoles are present. Exam reveals no gallop.    No murmur heard.  Pulses:       Radial pulses are 2+ on the right side, and 2+ on the left side.        Dorsalis pedis pulses are 2+ on the right side, and 2+ on the left side.        Posterior tibial pulses are 2+ on the right side, and 2+ on the left side.   Pulmonary/Chest: Effort normal and breath sounds normal. No accessory muscle usage. No tachypnea. No respiratory distress. He has no wheezes. He has no rhonchi. He has no rales.    Abdominal: Soft. Bowel sounds are normal. He exhibits no distension. There is no tenderness. There is no rebound, no guarding and no CVA tenderness.   Musculoskeletal: Normal range of motion. He exhibits no edema, tenderness or deformity.   Neurological: He is alert and oriented to person, place, and time. No cranial nerve deficit or sensory deficit.   Skin: Skin is warm, dry and intact. No rash noted. He is not diaphoretic. No cyanosis or erythema.   Psychiatric: He has a normal mood and affect. His speech is normal and behavior is normal. Cognition and memory are normal.   Nursing note and vitals reviewed.      Significant Labs:   Blood Culture:   Recent Labs  Lab 03/28/18 2005 03/28/18 2021   LABBLOO No Growth to date No Growth to date     BMP:   Recent Labs  Lab 03/29/18  0446   GLU 89      K 4.3      CO2 26   BUN 11   CREATININE 0.8   CALCIUM 9.5   MG 2.0     CBC:   Recent Labs  Lab 03/28/18 2005 03/29/18 0446   WBC 17.46* 18.92*   HGB 12.8* 12.1*   HCT 38.3* 36.7*   * 372*     All pertinent labs within the past 24 hours have been reviewed.    Significant Imaging: I have reviewed all pertinent imaging results/findings within the past 24 hours.

## 2018-03-30 NOTE — CONSULTS
[facility]  Infectious Disease  Consult Note    Patient Name: Christopher Madrigal  MRN: [id number]  Admission Date: 3/28/2018  Hospital Length of Stay: 0 days  Attending Physician: Ciarra Figueroa MD  Primary Care Provider: Primary Doctor No     Isolation Status: No active isolations    Patient information was obtained from patient, past medical records and ER records.      Consults  Assessment/Plan:     * Fever of unknown origin with leukocytosis    Will  Do cardiac echo.  Follow JAMIA.  If fever persists ,will do indium scan .  The migratory nature of the joint pain is suggestive of gonococcal infection.  Will check for blood cultures, start empiric rocephin ,check for pharyngeal swab for gonorrhoea            Thank you for your consult. I will follow-up with patient. Please contact us if you have any additional questions.    Leonides Hermosillo MD  Infectious Disease  [facility]    Subjective:     Principal Problem: Fever of unknown origin    HPI: 37 year old man without any significant past medical history who was admitted with 2 weeks of subjective fevers . 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 He is sexually active. Work-up in ED resulted WBC 17, , . GC chlamydia assay is negative. Serum procalcitonin  is normal.    Chest x-ray is normal. At this time,he is complaining of right great toe pain.      History reviewed. No pertinent past medical history.    History reviewed. No pertinent surgical history.    Review of patient's allergies indicates:  No Known Allergies    Medications:  No prescriptions prior to admission.     Antibiotics     None        Antifungals     None        Antivirals     None             There is no immunization history on file for this patient.    Family History     Family history is unknown by patient.        Social History     Social History    Marital status: Single     Spouse name: N/A    Number of children: N/A     Years of education: N/A     Social History Main Topics    Smoking status: Current Every Day Smoker     Types: Cigarettes    Smokeless tobacco: Never Used    Alcohol use No    Drug use: No    Sexual activity: Not Currently     Other Topics Concern    None     Social History Narrative    None     Review of Systems   Constitutional: Positive for fever. Negative for activity change, appetite change, chills, diaphoresis, fatigue and unexpected weight change.   HENT: Positive for sore throat. Negative for congestion, dental problem, ear pain, mouth sores, postnasal drip, rhinorrhea, sinus pressure and trouble swallowing.    Eyes: Negative for photophobia and visual disturbance.   Respiratory: Negative for apnea, cough, chest tightness, shortness of breath and wheezing.    Cardiovascular: Negative for chest pain, palpitations and leg swelling.   Gastrointestinal: Negative for abdominal distention, abdominal pain, blood in stool, constipation, diarrhea, nausea and vomiting.   Endocrine: Negative for polydipsia, polyphagia and polyuria.   Genitourinary: Negative for decreased urine volume, difficulty urinating, discharge, dysuria, flank pain, frequency, hematuria, penile pain and urgency.   Musculoskeletal: Positive for arthralgias (Right knee and left ankle pain) and myalgias. Negative for back pain, gait problem, joint swelling, neck pain and neck stiffness.        Generalized body aches.    Skin: Negative for color change, pallor, rash and wound.   Allergic/Immunologic: Negative for immunocompromised state.   Neurological: Negative for dizziness, tremors, seizures, syncope, facial asymmetry, speech difficulty, weakness, light-headedness, numbness and headaches.   Hematological: Does not bruise/bleed easily.   Psychiatric/Behavioral: Negative for confusion, hallucinations and sleep disturbance. The patient is not nervous/anxious.    All other systems reviewed and are negative.    Objective:     Vital Signs (Most  Recent):  Temp: 98.5 °F (36.9 °C) (03/30/18 0053)  Pulse: 68 (03/30/18 0053)  Resp: 18 (03/30/18 0053)  BP: 121/62 (03/30/18 0053)  SpO2: 95 % (03/30/18 0053) Vital Signs (24h Range):  Temp:  [98.5 °F (36.9 °C)-99.8 °F (37.7 °C)] 98.5 °F (36.9 °C)  Pulse:  [68-84] 68  Resp:  [14-18] 18  SpO2:  [95 %-97 %] 95 %  BP: (113-122)/(62-70) 121/62     Weight: 101.5 kg (223 lb 12.3 oz)  Body mass index is 32.57 kg/m².    Estimated Creatinine Clearance: 146.8 mL/min (based on SCr of 0.8 mg/dL).    Physical Exam   Constitutional: He is oriented to person, place, and time. He appears well-developed and well-nourished. No distress.   HENT:   Head: Normocephalic and atraumatic.   Eyes: Conjunctivae are normal.   PERRL; EOM intact.   Neck: Normal range of motion. Neck supple. No JVD present.   Cardiovascular: Normal rate, regular rhythm, S1 normal, S2 normal and intact distal pulses.   No extrasystoles are present. Exam reveals no gallop.    No murmur heard.  Pulses:       Radial pulses are 2+ on the right side, and 2+ on the left side.        Dorsalis pedis pulses are 2+ on the right side, and 2+ on the left side.        Posterior tibial pulses are 2+ on the right side, and 2+ on the left side.   Pulmonary/Chest: Effort normal and breath sounds normal. No accessory muscle usage. No tachypnea. No respiratory distress. He has no wheezes. He has no rhonchi. He has no rales.   Abdominal: Soft. Bowel sounds are normal. He exhibits no distension. There is no tenderness. There is no rebound, no guarding and no CVA tenderness.   Musculoskeletal: Normal range of motion. He exhibits no edema, tenderness or deformity.   Neurological: He is alert and oriented to person, place, and time. No cranial nerve deficit or sensory deficit.   Skin: Skin is warm, dry and intact. No rash noted. He is not diaphoretic. No cyanosis or erythema.   Psychiatric: He has a normal mood and affect. His speech is normal and behavior is normal. Cognition and memory  are normal.   Nursing note and vitals reviewed.      Significant Labs:   Blood Culture:   Recent Labs  Lab 03/28/18 2005 03/28/18 2021   LABBLOO No Growth to date No Growth to date     BMP:   Recent Labs  Lab 03/29/18  0446   GLU 89      K 4.3      CO2 26   BUN 11   CREATININE 0.8   CALCIUM 9.5   MG 2.0     CBC:   Recent Labs  Lab 03/28/18 2005 03/29/18 0446   WBC 17.46* 18.92*   HGB 12.8* 12.1*   HCT 38.3* 36.7*   * 372*     All pertinent labs within the past 24 hours have been reviewed.    Significant Imaging: I have reviewed all pertinent imaging results/findings within the past 24 hours.

## 2018-03-30 NOTE — PLAN OF CARE
"Problem: Patient Care Overview  Goal: Plan of Care Review  Outcome: Ongoing (interventions implemented as appropriate)  VSS during shift. During 0500 round, Pt reported "sweating" around 0200 this morning. Upon assessment and focused assessment at 0300, pts' skin appeared dry. Pt c/o pain to Right great toe and right knee during shift.   Fall precautions in place. Call light and personal items within reach. Educated patient on side effects of medication administered. Pt verbalized understanding. 24 hour order check done.  Will continue to monitor.        "

## 2018-03-31 LAB
B BURGDOR AB SER IA-ACNC: 0.15 INDEX VALUE
BACTERIA THROAT CULT: NORMAL

## 2018-04-02 LAB
ANA SER QL IF: NORMAL
N.GONORROHEAE, AMP RNA SOURCE: NORMAL
N.GONORROHEAE, MISC. AMP RNA: NEGATIVE

## 2018-04-02 NOTE — PLAN OF CARE
04/02/18 0817   Final Note   Assessment Type Final Discharge Note   Discharge Disposition Home   Right Care Referral Info   Post Acute Recommendation No Care

## 2018-04-03 LAB
B BURGDOR IGG SER QL IB: NEGATIVE
B BURGDOR IGM SER QL IB: NEGATIVE
BACTERIA BLD CULT: NORMAL
BACTERIA BLD CULT: NORMAL

## 2020-01-08 NOTE — ED PROVIDER NOTES
SCRIBE #1 NOTE: I, Aleyda Mcleod, am scribing for, and in the presence of, Anastacio Weaver NP. I have scribed the entire note.      History      Chief Complaint   Patient presents with    Fever     states fever for 2 weeks with joint pain       Review of patient's allergies indicates:  No Known Allergies     HPI   HPI    3/28/2018, 7:34 PM   History obtained from the patient      History of Present Illness: Christopher Madrigal is a 37 y.o. male patient who presents to the Emergency Department for fever which onset gradually two weeks ago. Symptoms are intermittent and moderate in severity. No mitigating or exacerbating factors reported. Associated sxs include myalgias, sore throat, and joint pain. Patient denies any cough, fatigue, leg swelling, nausea, vomiting, rash, dysuria, flank pain, and all other sxs at this time. Pt reports that he does not have a thermometer at home. No further complaints or concerns at this time.     Arrival mode: Personal vehicle    PCP: Primary Doctor No       Past Medical History:  History reviewed. No pertinent past medical history.    Past Surgical History:  History reviewed. No pertinent surgical history.      Family History:  No family history on file.    Social History:  Social History     Social History Main Topics    Smoking status: Current Every Day Smoker     Types: Cigarettes    Smokeless tobacco: Never Used    Alcohol use Yes    Drug use: Unknown    Sexual activity: Not on file       ROS   Review of Systems   Constitutional: Positive for chills and fever. Negative for fatigue.   HENT: Negative for sore throat.    Respiratory: Negative for shortness of breath.    Cardiovascular: Negative for chest pain.   Gastrointestinal: Negative for abdominal pain, nausea and vomiting.   Genitourinary: Negative for dysuria, flank pain and frequency.   Musculoskeletal: Positive for arthralgias, back pain and myalgias. Negative for gait problem and joint swelling.   Skin:  To DR Mildred Silva at [id number] [facility]  Will DR MAHMOOD sign and manage home health for pt? "Negative for rash and wound.   Neurological: Negative for weakness.   Hematological: Does not bruise/bleed easily.       Physical Exam      Initial Vitals [03/28/18 1917]   BP Pulse Resp Temp SpO2   129/76 88 20 99.4 °F (37.4 °C) 95 %      MAP       93.67          Physical Exam  Nursing Notes and Vital Signs Reviewed.  Constitutional: Patient is in no apparent distress. Well-developed and well-nourished.  Head: Atraumatic. Normocephalic.  Eyes: PERRL. EOM intact. Conjunctivae are not pale. No scleral icterus.  ENT: Mucous membranes are moist. Oropharynx is clear and symmetric.    Neck: Supple. Full ROM. No lymphadenopathy.  Cardiovascular: Regular rate. Regular rhythm. No murmurs, rubs, or gallops. Distal pulses are 2+ and symmetric.  Pulmonary/Chest: No respiratory distress. Clear to auscultation bilaterally. No wheezing or rales.  Abdominal: Soft and non-distended.  There is no tenderness.  No rebound, guarding, or rigidity. Good bowel sounds.  Genitourinary: No CVA tenderness  Musculoskeletal: Moves all extremities. No obvious deformities. No edema. No calf tenderness.  Skin: Warm and dry.  Neurological:  Alert, awake, and appropriate.  Normal speech.  No acute focal neurological deficits are appreciated.  Psychiatric: Normal affect. Good eye contact. Appropriate in content.    ED Course    Procedures  ED Vital Signs:  Vitals:    03/28/18 1917 03/29/18 0005   BP: 129/76 125/80   Pulse: 88 76   Resp: 20 18   Temp: 99.4 °F (37.4 °C)    TempSrc: Oral    SpO2: 95% 98%   Weight: 101.5 kg (223 lb 12.3 oz)    Height: 5' 9.5" (1.765 m)        Abnormal Lab Results:  Labs Reviewed   CBC W/ AUTO DIFFERENTIAL - Abnormal; Notable for the following:        Result Value    WBC 17.46 (*)     RBC 4.35 (*)     Hemoglobin 12.8 (*)     Hematocrit 38.3 (*)     Platelets 417 (*)     Gran # (ANC) 12.6 (*)     Mono # 1.6 (*)     All other components within normal limits   COMPREHENSIVE METABOLIC PANEL - Abnormal; Notable for the " following:     Albumin 3.2 (*)     Alkaline Phosphatase 143 (*)     AST 63 (*)      (*)     All other components within normal limits   URINALYSIS - Abnormal; Notable for the following:     Occult Blood UA 2+ (*)     All other components within normal limits   URINALYSIS MICROSCOPIC - Abnormal; Notable for the following:     RBC, UA 5 (*)     All other components within normal limits   C-REACTIVE PROTEIN - Abnormal; Notable for the following:     .1 (*)     All other components within normal limits   SEDIMENTATION RATE, MANUAL - Abnormal; Notable for the following:     Sed Rate 103 (*)     All other components within normal limits   CULTURE, BLOOD   CULTURE, BLOOD   PROTIME-INR   CK   C-REACTIVE PROTEIN   SEDIMENTATION RATE, MANUAL   URIC ACID   URIC ACID        All Lab Results:     Results for orders placed or performed during the hospital encounter of 03/28/18   CBC auto differential   Result Value Ref Range    WBC 17.46 (H) 3.90 - 12.70 K/uL    RBC 4.35 (L) 4.60 - 6.20 M/uL    Hemoglobin 12.8 (L) 14.0 - 18.0 g/dL    Hematocrit 38.3 (L) 40.0 - 54.0 %    MCV 88 82 - 98 fL    MCH 29.4 27.0 - 31.0 pg    MCHC 33.4 32.0 - 36.0 g/dL    RDW 12.5 11.5 - 14.5 %    Platelets 417 (H) 150 - 350 K/uL    MPV 9.3 9.2 - 12.9 fL    Gran # (ANC) 12.6 (H) 1.8 - 7.7 K/uL    Lymph # 3.1 1.0 - 4.8 K/uL    Mono # 1.6 (H) 0.3 - 1.0 K/uL    Eos # 0.1 0.0 - 0.5 K/uL    Baso # 0.05 0.00 - 0.20 K/uL    Gran% 72.2 38.0 - 73.0 %    Lymph% 18.0 18.0 - 48.0 %    Mono% 8.9 4.0 - 15.0 %    Eosinophil% 0.6 0.0 - 8.0 %    Basophil% 0.3 0.0 - 1.9 %    Differential Method Automated    Comprehensive metabolic panel   Result Value Ref Range    Sodium 137 136 - 145 mmol/L    Potassium 3.9 3.5 - 5.1 mmol/L    Chloride 103 95 - 110 mmol/L    CO2 24 23 - 29 mmol/L    Glucose 108 70 - 110 mg/dL    BUN, Bld 12 6 - 20 mg/dL    Creatinine 0.8 0.5 - 1.4 mg/dL    Calcium 9.5 8.7 - 10.5 mg/dL    Total Protein 8.3 6.0 - 8.4 g/dL    Albumin 3.2 (L) 3.5  - 5.2 g/dL    Total Bilirubin 0.3 0.1 - 1.0 mg/dL    Alkaline Phosphatase 143 (H) 55 - 135 U/L    AST 63 (H) 10 - 40 U/L     (H) 10 - 44 U/L    Anion Gap 10 8 - 16 mmol/L    eGFR if African American >60 >60 mL/min/1.73 m^2    eGFR if non African American >60 >60 mL/min/1.73 m^2   Urinalysis - Clean Catch   Result Value Ref Range    Specimen UA Urine, Clean Catch     Color, UA Yellow Yellow, Straw, Katerin    Appearance, UA Clear Clear    pH, UA 6.0 5.0 - 8.0    Specific Gravity, UA 1.015 1.005 - 1.030    Protein, UA Negative Negative    Glucose, UA Negative Negative    Ketones, UA Negative Negative    Bilirubin (UA) Negative Negative    Occult Blood UA 2+ (A) Negative    Nitrite, UA Negative Negative    Urobilinogen, UA Negative <2.0 EU/dL    Leukocytes, UA Negative Negative   Protime-INR   Result Value Ref Range    Prothrombin Time 12.3 9.0 - 12.5 sec    INR 1.2 0.8 - 1.2   CPK   Result Value Ref Range    CPK 60 20 - 200 U/L   Urinalysis Microscopic   Result Value Ref Range    RBC, UA 5 (H) 0 - 4 /hpf    Squam Epithel, UA 5 /hpf    Microscopic Comment SEE COMMENT    C-reactive protein   Result Value Ref Range    .1 (H) 0.0 - 8.2 mg/L   Uric acid   Result Value Ref Range    Uric Acid 6.3 3.4 - 7.0 mg/dL   Sedimentation rate, manual   Result Value Ref Range    Sed Rate 103 (H) 0 - 10 mm/Hr         Imaging Results:  Imaging Results          X-Ray Chest PA And Lateral (Final result)  Result time 03/28/18 20:09:36    Final result by Adela Leone MD (03/28/18 20:09:36)                 Impression:         Negative two-view chest x-ray.      Electronically signed by: ADELA LEONE MD  Date:     03/28/18  Time:    20:09              Narrative:    Two-view chest x-ray.03/28/18 19:44:43    Clinical indication: cough      Heart size is normal. The lung fields are clear. No acute pulmonary infiltrate.                             The EKG was ordered, reviewed, and independently interpreted by the ED  provider.  Interpretation time: 22:21  Rate: 68 BPM  Rhythm: normal sinus rhythm  Interpretation: Minimal voltage criteria for LVH. No STEMI.          The Emergency Provider reviewed the vital signs and test results, which are outlined above.    ED Discussion     Consult: I discussed the case with Sabine BENZ ([facility] medicine). Dr. Garcia will admit the pt and agrees with current management. Recommends Observation admit     12:55 AM Reassessment: I reassessed the pt.  Discussed test results, shared treatment plan, and the need for admission.  Pt and family understand and agree to the plan.  All questions were answered at this time.    Admitting service: Hospital medicine   Admitting physician: Lawrence Garcia MD  Admit to: [facility]             ED Medication(s):  Medications   sodium chloride 0.9% bolus 1,000 mL (0 mLs Intravenous Stopped 3/28/18 2300)   acetaminophen (10 mg/mL) injection 1,000 mg (0 mg Intravenous Stopped 3/29/18 0042)       New Prescriptions    No medications on file             Medical Decision Making              Scribe Attestation:   Scribe #1: I performed the above scribed service and the documentation accurately describes the services I performed. I attest to the accuracy of the note.    Attending:   Physician Attestation Statement for Scribe #1: I, Cliff Remy MD, personally performed the services described in this documentation, as scribed by Anastacio Weaver, in my presence, and it is both accurate and complete.          Clinical Impression       ICD-10-CM ICD-9-CM   1. Fever, unspecified fever cause R50.9 780.60   2. Chest wall pain R07.89 786.52   3. Leukocytosis, unspecified type D72.829 288.60   4. Arthralgia, unspecified joint M25.50 719.40       Disposition:   Disposition: Admitted  Condition: Fair           Anastacio Weaver NP  03/29/18 0105

## 2022-12-07 NOTE — DISCHARGE INSTRUCTIONS
[facility] - [address] [place] [address]  [phone]   --- Please  Call for follow up PCP  Appt clinic is closed today on Good Friday.   < from: Nuclear Stress Test-Exercise (Nuclear Stress Test-Exercise .) (12.07.22 @ 13:25) >    IMPRESSIONS:Abnormal Study  * Exercise capacity: 9 METS, Average for age and gender.  * Chest Pain: No chest pain withexercise.  * Symptom: Fatigue, Shortness of breath.  * HR Response: Appropriate; BP Response: Appropriate.  * Heart Rhythm: Sinus Bradycardia - 55 BPM.  * Baseline ECG: No significant ST abnormalities.  * ECG Changes: ST Depression: Approximately 1.5mm  upsloping in leads V3-V6 started at 6:00 min of exercise  at HR of 130 bpm and persisted 3:00 min into recovery.  * The left ventricle was normal in size.  * There are medium-sized, mild to moderate defects in the  basal inferior and basal to midinferoseptal walls that  are mostly fixed suggestive of infarct with minimal  festus-infarct ischemia.  * Post-stress gated wall motion analysis was performed  (LVEF = 63 %;LVEDV = 96 ml.) revealing reduced systolic  thickening of the basal inferior and basal to mid  inferoseptal walls.  Overall left ventricular ejection  fraction is normal.